# Patient Record
Sex: FEMALE | Race: WHITE | Employment: UNEMPLOYED | URBAN - METROPOLITAN AREA
[De-identification: names, ages, dates, MRNs, and addresses within clinical notes are randomized per-mention and may not be internally consistent; named-entity substitution may affect disease eponyms.]

---

## 2018-08-12 ENCOUNTER — APPOINTMENT (OUTPATIENT)
Dept: CT IMAGING | Facility: HOSPITAL | Age: 33
End: 2018-08-12
Attending: EMERGENCY MEDICINE
Payer: COMMERCIAL

## 2018-08-12 ENCOUNTER — HOSPITAL ENCOUNTER (EMERGENCY)
Facility: HOSPITAL | Age: 33
Discharge: HOME | End: 2018-08-12
Attending: EMERGENCY MEDICINE
Payer: COMMERCIAL

## 2018-08-12 VITALS
HEART RATE: 56 BPM | RESPIRATION RATE: 16 BRPM | DIASTOLIC BLOOD PRESSURE: 55 MMHG | WEIGHT: 125 LBS | TEMPERATURE: 99 F | OXYGEN SATURATION: 100 % | SYSTOLIC BLOOD PRESSURE: 94 MMHG

## 2018-08-12 DIAGNOSIS — S16.1XXA STRAIN OF NECK MUSCLE, INITIAL ENCOUNTER: ICD-10-CM

## 2018-08-12 DIAGNOSIS — G47.00 INSOMNIA, UNSPECIFIED TYPE: Primary | ICD-10-CM

## 2018-08-12 DIAGNOSIS — M62.838 MUSCLE SPASM: ICD-10-CM

## 2018-08-12 LAB — B-HCG UR QL: NEGATIVE

## 2018-08-12 PROCEDURE — 72125 CT NECK SPINE W/O DYE: CPT | Performed by: EMERGENCY MEDICINE

## 2018-08-12 PROCEDURE — 96372 THER/PROPH/DIAG INJ SC/IM: CPT

## 2018-08-12 PROCEDURE — 81025 URINE PREGNANCY TEST: CPT

## 2018-08-12 PROCEDURE — 99284 EMERGENCY DEPT VISIT MOD MDM: CPT

## 2018-08-12 RX ORDER — LIDOCAINE 50 MG/G
1 PATCH TOPICAL EVERY 24 HOURS
Qty: 6 PATCH | Refills: 0 | Status: SHIPPED | OUTPATIENT
Start: 2018-08-12 | End: 2018-08-18

## 2018-08-12 RX ORDER — NAPROXEN 500 MG/1
500 TABLET ORAL 2 TIMES DAILY PRN
Qty: 10 TABLET | Refills: 0 | Status: SHIPPED | OUTPATIENT
Start: 2018-08-12 | End: 2018-08-17

## 2018-08-12 RX ORDER — LIDOCAINE 50 MG/G
1 PATCH TOPICAL ONCE
Status: DISCONTINUED | OUTPATIENT
Start: 2018-08-12 | End: 2018-08-12

## 2018-08-12 RX ORDER — KETOROLAC TROMETHAMINE 30 MG/ML
30 INJECTION, SOLUTION INTRAMUSCULAR; INTRAVENOUS ONCE
Status: COMPLETED | OUTPATIENT
Start: 2018-08-12 | End: 2018-08-12

## 2018-08-12 RX ORDER — DIAZEPAM 5 MG/1
5 TABLET ORAL EVERY 12 HOURS PRN
Qty: 6 TABLET | Refills: 0 | Status: SHIPPED | OUTPATIENT
Start: 2018-08-12 | End: 2018-08-15

## 2018-08-12 RX ORDER — DIAZEPAM 5 MG/1
5 TABLET ORAL ONCE
Status: COMPLETED | OUTPATIENT
Start: 2018-08-12 | End: 2018-08-12

## 2018-08-12 RX ORDER — MECLIZINE HYDROCHLORIDE 25 MG/1
25 TABLET ORAL ONCE
Status: COMPLETED | OUTPATIENT
Start: 2018-08-12 | End: 2018-08-12

## 2018-08-12 NOTE — ED PROVIDER NOTES
Patient Seen in: [facility] AND [facility] Emergency Department    History   Patient presents with:  Neck Pain (musculoskeletal, neurologic)  Insomnia (neurologic)    Stated Complaint: Neck pain    HPI    35 y/o female w/ no sig PMH who has had trouble sleeping per  Neurological: Alert and oriented to person, place, and time. Skin: Skin is warm and dry. Psychiatric: Normal mood and affect. Behavior is normal.   Nursing note and vitals reviewed.     Differential diagnosis includes insomnia, cervical strain, ce Patch  Place 1 patch onto the skin daily. Qty: 6 patch Refills: 0    naproxen 500 MG Oral Tab  Take 1 tablet (500 mg total) by mouth 2 (two) times daily as needed.   Qty: 10 tablet Refills: 0    diazepam 5 MG Oral Tab  Take 1 tablet (5 mg total) by mouth e

## 2018-08-12 NOTE — ED INITIAL ASSESSMENT (HPI)
Triage:  Patient states she has been unable to sleep for the past 3 days due to neck pain and headaches. Took sleeping pill without relief.

## 2020-07-17 ENCOUNTER — OFFICE VISIT (OUTPATIENT)
Dept: FAMILY MEDICINE CLINIC | Facility: CLINIC | Age: 35
End: 2020-07-17
Payer: COMMERCIAL

## 2020-07-17 VITALS
HEART RATE: 56 BPM | SYSTOLIC BLOOD PRESSURE: 103 MMHG | DIASTOLIC BLOOD PRESSURE: 67 MMHG | BODY MASS INDEX: 23.37 KG/M2 | TEMPERATURE: 99 F | WEIGHT: 127 LBS | HEIGHT: 62 IN

## 2020-07-17 DIAGNOSIS — R10.2 PELVIC PAIN: ICD-10-CM

## 2020-07-17 DIAGNOSIS — Z98.890 HISTORY OF REMOVAL OF OVARIAN CYST: ICD-10-CM

## 2020-07-17 DIAGNOSIS — N94.3 PMS (PREMENSTRUAL SYNDROME): ICD-10-CM

## 2020-07-17 DIAGNOSIS — Z87.42 HISTORY OF REMOVAL OF OVARIAN CYST: ICD-10-CM

## 2020-07-17 DIAGNOSIS — Z00.00 ANNUAL PHYSICAL EXAM: Primary | ICD-10-CM

## 2020-07-17 PROCEDURE — 3074F SYST BP LT 130 MM HG: CPT | Performed by: FAMILY MEDICINE

## 2020-07-17 PROCEDURE — 3078F DIAST BP <80 MM HG: CPT | Performed by: FAMILY MEDICINE

## 2020-07-17 PROCEDURE — 3008F BODY MASS INDEX DOCD: CPT | Performed by: FAMILY MEDICINE

## 2020-07-17 PROCEDURE — 99385 PREV VISIT NEW AGE 18-39: CPT | Performed by: FAMILY MEDICINE

## 2020-07-17 NOTE — PATIENT INSTRUCTIONS
El síndrome premenstrual y gilbert relación con el ciclo  El síndrome premenstrual (SPM) es diamante afección real causada por la respuesta del cuerpo ante un ciclo menstrual normal. Los [employer] niveles de hormonas (mensajeros químicos) del cuerpo dan lugar · La progesterona puede tener un efecto “tranquilizante” del cuerpo y así aliviar los síntomas físicos ocasionados por los cambios mensuales del organismo.  En mujeres con SPM, la progesterona puede no tener [employer] tranquilizante y cristina síntomas podrían · Ronnie ejercicios wagner 30 minutos gay todos los días de la [place]. Si esto le parece demasiado, comience con guilherme minutos al día y vaya aumentando el tiempo progresivamente. · Encuentre maneras de integrar la actividad física en gilbert elvira cotidiana.  Int Usted no tiene que lidiar con el SPM krista. Para ayudar a sobrellevar el SPM:  · Hable con cristina familiares, amigos y compañeros de [place].   · Hágales saber cómo pueden ayudarle cuando presente los síntomas del SPM.  · Hable con cristina amigas y apóyense unas a · El magnesio puede ayudar aliviar la sensación de inflamación abdominal y la sensibilidad de los senos. Se encuentra en muchos alimentos, tales lindsey las frutas frescas y los vegetales.  Para ayudar a gilbert cuerpo a obtener suficiente magnesio, coma schuyler o má · El azúcar es un carbohidrato que le proporciona sólo pequeñas dosis de [place]. Si le encanta el [place], elija alimentos que también melissa ricos en fibras, lindsey diamante [place] sukhjinder sin pelar o un muffin de salvado.   · La cafeína puede perturbar el sueño y esto hac · Efectos secundarios. Los antiinflamatorios no esteroides pueden causar trastornos estomacales, rachel tomarlos con alimentos puede [employer]. · Otras preocupaciones. No es recomendable que usted tome más de un tipo de NSAID al [employer].  Adem · Beneficios. Los SSRI ayudan a [employer], por ejemplo, irritabilidad, depresión y [place] de humor. También alivian síntomas físicos lindsey la distensión abdominal, la sensibilidad de las mamas y los cambios de apetito.   · Efectos sec

## 2020-07-17 NOTE — PROGRESS NOTES
HPI:   Porfirio Lomas is a 37year old female who presents for a complete physical exam.    Work: Works as an elderly caretaker. Social: Lives with family  Diet: eats home cooked meals  Exercise: exercises 4 times a week, mainly cardio/weights.    GYN 07/04/2020 (Exact Date)   BMI 23.23 kg/m²     GENERAL: well developed, well nourished, in no apparent distress  SKIN: no rashes, no suspicious lesions  HEENT: atraumatic, normocephalic, ears are clear  EYES: PERRLA, EOMI,conjunctiva are clear  NECK: supple - US PELVIS (TRANSVAGINAL PELVIS) (CPT=76830); Future    4. History of removal of ovarian cyst  - as above   - US PELVIS (TRANSVAGINAL PELVIS) (CPT=76830);  Future      Bouchra Pollard MD  7/17/2020  2:06 PM

## 2020-07-21 ENCOUNTER — APPOINTMENT (OUTPATIENT)
Dept: LAB | Age: 35
End: 2020-07-21
Attending: FAMILY MEDICINE
Payer: COMMERCIAL

## 2020-07-21 LAB
ALBUMIN SERPL-MCNC: 4 G/DL (ref 3.4–5)
ALBUMIN/GLOB SERPL: 1.1 {RATIO} (ref 1–2)
ALP LIVER SERPL-CCNC: 67 U/L (ref 37–98)
ALT SERPL-CCNC: 27 U/L (ref 13–56)
ANION GAP SERPL CALC-SCNC: 9 MMOL/L (ref 0–18)
AST SERPL-CCNC: 20 U/L (ref 15–37)
BASOPHILS # BLD AUTO: 0.02 X10(3) UL (ref 0–0.2)
BASOPHILS NFR BLD AUTO: 0.4 %
BILIRUB SERPL-MCNC: 0.5 MG/DL (ref 0.1–2)
BUN BLD-MCNC: 13 MG/DL (ref 7–18)
BUN/CREAT SERPL: 16.9 (ref 10–20)
CALCIUM BLD-MCNC: 8.8 MG/DL (ref 8.5–10.1)
CHLORIDE SERPL-SCNC: 109 MMOL/L (ref 98–112)
CHOLEST SMN-MCNC: 189 MG/DL (ref ?–200)
CO2 SERPL-SCNC: 24 MMOL/L (ref 21–32)
CREAT BLD-MCNC: 0.77 MG/DL (ref 0.55–1.02)
DEPRECATED RDW RBC AUTO: 40.8 FL (ref 35.1–46.3)
EOSINOPHIL # BLD AUTO: 0.09 X10(3) UL (ref 0–0.7)
EOSINOPHIL NFR BLD AUTO: 1.7 %
ERYTHROCYTE [DISTWIDTH] IN BLOOD BY AUTOMATED COUNT: 11.9 % (ref 11–15)
EST. AVERAGE GLUCOSE BLD GHB EST-MCNC: 105 MG/DL (ref 68–126)
GLOBULIN PLAS-MCNC: 3.7 G/DL (ref 2.8–4.4)
GLUCOSE BLD-MCNC: 80 MG/DL (ref 70–99)
HBA1C MFR BLD HPLC: 5.3 % (ref ?–5.7)
HCT VFR BLD AUTO: 40.5 % (ref 35–48)
HDLC SERPL-MCNC: 54 MG/DL (ref 40–59)
HGB BLD-MCNC: 13.4 G/DL (ref 12–16)
IMM GRANULOCYTES # BLD AUTO: 0.01 X10(3) UL (ref 0–1)
IMM GRANULOCYTES NFR BLD: 0.2 %
LDLC SERPL CALC-MCNC: 117 MG/DL (ref ?–100)
LYMPHOCYTES # BLD AUTO: 2.08 X10(3) UL (ref 1–4)
LYMPHOCYTES NFR BLD AUTO: 40.2 %
M PROTEIN MFR SERPL ELPH: 7.7 G/DL (ref 6.4–8.2)
MCH RBC QN AUTO: 31 PG (ref 26–34)
MCHC RBC AUTO-ENTMCNC: 33.1 G/DL (ref 31–37)
MCV RBC AUTO: 93.8 FL (ref 80–100)
MONOCYTES # BLD AUTO: 0.34 X10(3) UL (ref 0.1–1)
MONOCYTES NFR BLD AUTO: 6.6 %
NEUTROPHILS # BLD AUTO: 2.64 X10 (3) UL (ref 1.5–7.7)
NEUTROPHILS # BLD AUTO: 2.64 X10(3) UL (ref 1.5–7.7)
NEUTROPHILS NFR BLD AUTO: 50.9 %
NONHDLC SERPL-MCNC: 135 MG/DL (ref ?–130)
OSMOLALITY SERPL CALC.SUM OF ELEC: 293 MOSM/KG (ref 275–295)
PATIENT FASTING Y/N/NP: YES
PATIENT FASTING Y/N/NP: YES
PLATELET # BLD AUTO: 305 10(3)UL (ref 150–450)
POTASSIUM SERPL-SCNC: 4.1 MMOL/L (ref 3.5–5.1)
RBC # BLD AUTO: 4.32 X10(6)UL (ref 3.8–5.3)
SODIUM SERPL-SCNC: 142 MMOL/L (ref 136–145)
TRIGL SERPL-MCNC: 90 MG/DL (ref 30–149)
TSI SER-ACNC: 1.83 MIU/ML (ref 0.36–3.74)
VLDLC SERPL CALC-MCNC: 18 MG/DL (ref 0–30)
WBC # BLD AUTO: 5.2 X10(3) UL (ref 4–11)

## 2020-07-21 PROCEDURE — 82306 VITAMIN D 25 HYDROXY: CPT | Performed by: FAMILY MEDICINE

## 2020-07-21 PROCEDURE — 80053 COMPREHEN METABOLIC PANEL: CPT | Performed by: FAMILY MEDICINE

## 2020-07-21 PROCEDURE — 85025 COMPLETE CBC W/AUTO DIFF WBC: CPT | Performed by: FAMILY MEDICINE

## 2020-07-21 PROCEDURE — 36415 COLL VENOUS BLD VENIPUNCTURE: CPT | Performed by: FAMILY MEDICINE

## 2020-07-21 PROCEDURE — 84443 ASSAY THYROID STIM HORMONE: CPT | Performed by: FAMILY MEDICINE

## 2020-07-21 PROCEDURE — 80061 LIPID PANEL: CPT | Performed by: FAMILY MEDICINE

## 2020-07-21 PROCEDURE — 83036 HEMOGLOBIN GLYCOSYLATED A1C: CPT | Performed by: FAMILY MEDICINE

## 2020-07-22 LAB — 25(OH)D3 SERPL-MCNC: 35.2 NG/ML (ref 30–100)

## 2020-07-29 ENCOUNTER — TELEPHONE (OUTPATIENT)
Dept: FAMILY MEDICINE CLINIC | Facility: CLINIC | Age: 35
End: 2020-07-29

## 2020-07-29 NOTE — TELEPHONE ENCOUNTER
Verified name and . Results/recommendations reviewed with pt and pt verb understanding and agrees to plan                   Danny Caba MD  2020  2:40 PM      Results reviewed.  Tests show no significant abnormalities, blood counts, vitamin

## 2020-07-29 NOTE — TELEPHONE ENCOUNTER
Called pt- VM is not set up , unable to leave voice message. Dorota Hagen  7/27/2020  4:31 PM      Called pt and unable to leave message, no voicemail.      Tima Molina, [facility] Debbie  7/23/2020  3:37 PM      Attempted to reach patient, vm not set up

## 2021-02-02 ENCOUNTER — TELEMEDICINE (OUTPATIENT)
Dept: FAMILY MEDICINE CLINIC | Facility: CLINIC | Age: 36
End: 2021-02-02

## 2021-02-02 DIAGNOSIS — N94.3 PMS (PREMENSTRUAL SYNDROME): Primary | ICD-10-CM

## 2021-02-02 DIAGNOSIS — N39.46 MIXED STRESS AND URGE URINARY INCONTINENCE: ICD-10-CM

## 2021-02-02 DIAGNOSIS — R10.2 PELVIC PAIN: ICD-10-CM

## 2021-02-02 PROCEDURE — 99214 OFFICE O/P EST MOD 30 MIN: CPT | Performed by: FAMILY MEDICINE

## 2021-02-02 NOTE — PROGRESS NOTES
Virtual Telephone Check-In    Brad Steward verbally consents to a Virtual/Telephone Check-In service on 02/02/21.     Patient understands and accepts financial responsibility for any deductible, co-insurance and/or co-pays associated with this service MD  02/02/21

## 2021-02-22 ENCOUNTER — HOSPITAL ENCOUNTER (OUTPATIENT)
Dept: ULTRASOUND IMAGING | Age: 36
Discharge: HOME | End: 2021-02-22
Attending: FAMILY MEDICINE
Payer: COMMERCIAL

## 2021-02-22 DIAGNOSIS — R10.2 PELVIC PAIN: ICD-10-CM

## 2021-02-22 DIAGNOSIS — N94.3 PMS (PREMENSTRUAL SYNDROME): ICD-10-CM

## 2021-02-22 PROCEDURE — 76856 US EXAM PELVIC COMPLETE: CPT | Performed by: FAMILY MEDICINE

## 2021-02-22 PROCEDURE — 76830 TRANSVAGINAL US NON-OB: CPT | Performed by: FAMILY MEDICINE

## 2021-03-02 ENCOUNTER — OFFICE VISIT (OUTPATIENT)
Dept: UROLOGY | Facility: HOSPITAL | Age: 36
End: 2021-03-02
Attending: OBSTETRICS & GYNECOLOGY
Payer: COMMERCIAL

## 2021-03-02 VITALS
SYSTOLIC BLOOD PRESSURE: 120 MMHG | HEIGHT: 62 IN | BODY MASS INDEX: 23.37 KG/M2 | WEIGHT: 127 LBS | DIASTOLIC BLOOD PRESSURE: 64 MMHG

## 2021-03-02 DIAGNOSIS — N39.41 URGE INCONTINENCE: ICD-10-CM

## 2021-03-02 DIAGNOSIS — N39.3 FEMALE STRESS INCONTINENCE: Primary | ICD-10-CM

## 2021-03-02 PROCEDURE — 99212 OFFICE O/P EST SF 10 MIN: CPT

## 2021-03-02 NOTE — PROGRESS NOTES
ID: Rosa Hinkle  : 1985  Date: 3/2/2021     Referred by Dr. Mary Fiore MD    Patient presents with:   Incontinence      HPI:  The patient is a 37year-old female,  (vaginal deliveries), who presents for evaluation of urinary leaking midline; thyroid is smooth, not enlarged, and without nodules. No lymphadenopathy or masses palpated. LUNGS:  Normal respiratory effort.     ABDOMEN: Non tender to palpation, tone normal without rigidity or guarding, no masses present, no evidence of her

## 2021-04-06 ENCOUNTER — OFFICE VISIT (OUTPATIENT)
Dept: UROLOGY | Facility: HOSPITAL | Age: 36
End: 2021-04-06
Attending: OBSTETRICS & GYNECOLOGY
Payer: COMMERCIAL

## 2021-04-06 VITALS
BODY MASS INDEX: 23.37 KG/M2 | RESPIRATION RATE: 20 BRPM | TEMPERATURE: 99 F | WEIGHT: 127 LBS | HEIGHT: 62 IN | DIASTOLIC BLOOD PRESSURE: 62 MMHG | SYSTOLIC BLOOD PRESSURE: 120 MMHG

## 2021-04-06 DIAGNOSIS — N39.41 URGE INCONTINENCE: ICD-10-CM

## 2021-04-06 DIAGNOSIS — N39.3 FEMALE STRESS INCONTINENCE: Primary | ICD-10-CM

## 2021-04-06 PROCEDURE — 99212 OFFICE O/P EST SF 10 MIN: CPT

## 2021-04-06 PROCEDURE — 57160 INSERT PESSARY/OTHER DEVICE: CPT

## 2021-04-06 NOTE — PROGRESS NOTES
ID: Con Egan  : 1985  Date: 21      Patient presents with: Incontinence      HPI:  The patient is a 37year-old female,  (vaginal deliveries), who was last seen in the office on 3/2/21.   Please refer to previous office note for f with our nurse. She felt comfortable with the pessary. Instructed on use and cleaning. She will use dental floss technique if needed for removal. Follow up in 4 weeks or sooner prn.      Jonathan Bustillo MD, [facility]  Female Pelvic Medicine and  Recons

## 2021-06-01 ENCOUNTER — TELEPHONE (OUTPATIENT)
Dept: UROLOGY | Facility: HOSPITAL | Age: 36
End: 2021-06-01

## 2021-06-01 NOTE — TELEPHONE ENCOUNTER
Called patient using the  service. Spoke with Nabil at [phone]. He was unable to leave a voicemail. Patients voicemail is not set up. Will attempt to reach out to patient again. Patient has missed her last two appointments.  Patient has pessary in

## 2021-06-02 ENCOUNTER — TELEPHONE (OUTPATIENT)
Dept: UROLOGY | Facility: HOSPITAL | Age: 36
End: 2021-06-02

## 2021-06-02 NOTE — TELEPHONE ENCOUNTER
Attempted to reach patient today in regards to her missed appointments with Dr. Radames Christianson. Unable to leave a message. Mailbox not set up to leave voicemail.

## 2021-06-16 ENCOUNTER — NURSE TRIAGE (OUTPATIENT)
Dept: FAMILY MEDICINE CLINIC | Facility: CLINIC | Age: 36
End: 2021-06-16

## 2021-06-16 NOTE — TELEPHONE ENCOUNTER
Please reply to pool: EM RN TRIAGE      Action Requested: Summary for Provider     []  Critical Lab, Recommendations Needed  [] Need Additional Advice  [x]   FYI    []   Need Orders  [] Need Medications Sent to Pharmacy  []  Other     SUMMARY: Advised ED

## 2021-06-17 NOTE — TELEPHONE ENCOUNTER
With language line  Crissy Marsh ID # [id number]. Identified patient's name and . No answer, unable to leave message, no voicemail set up.

## 2021-06-22 NOTE — TELEPHONE ENCOUNTER
NO ER note/encounter. No future appointments. Using Rg Duran #[id number], called patient and 's number but both just keep ringing and went to voice mail, but says voice mail not set up yet, unable to leave any messages.   No phone response

## 2021-11-11 ENCOUNTER — OFFICE VISIT (OUTPATIENT)
Dept: FAMILY MEDICINE CLINIC | Facility: CLINIC | Age: 36
End: 2021-11-11
Payer: COMMERCIAL

## 2021-11-11 VITALS
SYSTOLIC BLOOD PRESSURE: 113 MMHG | HEART RATE: 57 BPM | HEIGHT: 62 IN | DIASTOLIC BLOOD PRESSURE: 73 MMHG | TEMPERATURE: 99 F | WEIGHT: 130 LBS | BODY MASS INDEX: 23.92 KG/M2

## 2021-11-11 DIAGNOSIS — N30.00 ACUTE CYSTITIS WITHOUT HEMATURIA: Primary | ICD-10-CM

## 2021-11-11 PROCEDURE — 3078F DIAST BP <80 MM HG: CPT | Performed by: FAMILY MEDICINE

## 2021-11-11 PROCEDURE — 99213 OFFICE O/P EST LOW 20 MIN: CPT | Performed by: FAMILY MEDICINE

## 2021-11-11 PROCEDURE — 3074F SYST BP LT 130 MM HG: CPT | Performed by: FAMILY MEDICINE

## 2021-11-11 PROCEDURE — 3008F BODY MASS INDEX DOCD: CPT | Performed by: FAMILY MEDICINE

## 2021-11-11 RX ORDER — PHENAZOPYRIDINE HYDROCHLORIDE 100 MG/1
100 TABLET, FILM COATED ORAL 3 TIMES DAILY PRN
Qty: 30 TABLET | Refills: 0 | Status: SHIPPED | OUTPATIENT
Start: 2021-11-11

## 2021-11-11 RX ORDER — NITROFURANTOIN 25; 75 MG/1; MG/1
100 CAPSULE ORAL 2 TIMES DAILY
Qty: 10 CAPSULE | Refills: 0 | Status: SHIPPED | OUTPATIENT
Start: 2021-11-11 | End: 2021-11-16

## 2021-11-12 NOTE — PROGRESS NOTES
Patient presents with:  Urinary Symptoms: c/o urgency     HPI:   Kelly Hernandez is a 31year old female who presents to clinic with complaints of urinary urgency that started 24 hours ago. Feels some incomplete voiding and frequency.    No dysuria, hema

## 2021-11-15 ENCOUNTER — TELEPHONE (OUTPATIENT)
Dept: FAMILY MEDICINE CLINIC | Facility: CLINIC | Age: 36
End: 2021-11-15

## 2022-02-11 ENCOUNTER — NURSE TRIAGE (OUTPATIENT)
Dept: FAMILY MEDICINE CLINIC | Facility: CLINIC | Age: 37
End: 2022-02-11

## 2022-07-01 ENCOUNTER — OFFICE VISIT (OUTPATIENT)
Dept: FAMILY MEDICINE CLINIC | Facility: CLINIC | Age: 37
End: 2022-07-01
Payer: COMMERCIAL

## 2022-07-01 VITALS
DIASTOLIC BLOOD PRESSURE: 61 MMHG | SYSTOLIC BLOOD PRESSURE: 103 MMHG | WEIGHT: 131 LBS | HEART RATE: 51 BPM | BODY MASS INDEX: 24.11 KG/M2 | HEIGHT: 62 IN

## 2022-07-01 DIAGNOSIS — S05.8X2A ABRASION OF LEFT EYE, INITIAL ENCOUNTER: Primary | ICD-10-CM

## 2022-08-02 ENCOUNTER — OFFICE VISIT (OUTPATIENT)
Dept: FAMILY MEDICINE CLINIC | Facility: CLINIC | Age: 37
End: 2022-08-02
Payer: COMMERCIAL

## 2022-08-02 VITALS
HEART RATE: 54 BPM | HEIGHT: 62 IN | SYSTOLIC BLOOD PRESSURE: 115 MMHG | DIASTOLIC BLOOD PRESSURE: 75 MMHG | BODY MASS INDEX: 23.74 KG/M2 | WEIGHT: 129 LBS | TEMPERATURE: 98 F

## 2022-08-02 DIAGNOSIS — R22.32 MASS OF LEFT AXILLA: Primary | ICD-10-CM

## 2022-08-02 PROCEDURE — 99213 OFFICE O/P EST LOW 20 MIN: CPT | Performed by: FAMILY MEDICINE

## 2022-08-02 PROCEDURE — 3074F SYST BP LT 130 MM HG: CPT | Performed by: FAMILY MEDICINE

## 2022-08-02 PROCEDURE — 3008F BODY MASS INDEX DOCD: CPT | Performed by: FAMILY MEDICINE

## 2022-08-02 PROCEDURE — 3078F DIAST BP <80 MM HG: CPT | Performed by: FAMILY MEDICINE

## 2022-08-04 ENCOUNTER — TELEPHONE (OUTPATIENT)
Dept: FAMILY MEDICINE CLINIC | Facility: CLINIC | Age: 37
End: 2022-08-04

## 2022-08-04 DIAGNOSIS — R22.32 MASS OF LEFT AXILLA: Primary | ICD-10-CM

## 2022-08-04 NOTE — TELEPHONE ENCOUNTER
Per Collin Rosen any patient that is over 27 yrs old need diagnostic Mammogram, need order patient can have appointment.

## 2022-08-05 NOTE — TELEPHONE ENCOUNTER
Would verify with mammogram department that I do not need to add another diagnosis code in order to get her diagnostic mammogram approved. Pended order signed. Thank you    1. Mass of left axilla  - MEGAN DELMY 2D+3D DIAGNOSTIC MEGAN  BILAT (CPT=77066/42271);  Future

## 2022-08-15 NOTE — TELEPHONE ENCOUNTER
Patient calling for her results from mammogram and ultra sound. Please call with  at [phone],[facility].

## 2022-08-15 NOTE — TELEPHONE ENCOUNTER
With  ID # [id number]    Patient contacted (name and  of patient verified). All results and recommendations reviewed. Patient verbalizes understanding; denies further questions and agrees with plan of care.

## 2022-08-15 NOTE — TELEPHONE ENCOUNTER
Final results for mammogram and US breast are in the system but Dr Alex Gould still needs to review them.

## 2022-08-15 NOTE — TELEPHONE ENCOUNTER
Please inform patient that her diagnostic mammogram did not show any abnormalities in the right breast but did show a mass at the 10 o'clock position in her left breast.  It measures just over a centimeter. 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 The recommendation by radiology is to repeat imaging with a mammogram and an ultrasound in 6 months. This will help the all you determine stability. I have placed the orders and she can schedule these at her earliest convenience. Thanks      1. Mass of upper inner quadrant of left breast  - US BREAST LEFT LIMITED (DWY=10844); Future  - [facility] DIAGNOSTIC BILATERAL (CPT=77066);  Future

## 2022-08-18 NOTE — TELEPHONE ENCOUNTER
Please call radiology department and have them cancel the order I placed for the bilateral diagnostic mammogram.  I cannot cancel it on my end. Patient only needs diagnostic mammogram left breast and ultrasound left breast in 6 months.   Thank you

## 2023-01-07 NOTE — ED INITIAL ASSESSMENT (HPI)
Patient presents to the ED via EMS c/o RLQ and LLQ abdominal pain x one hour. Pt is 14 weeks pregnant. Denies any vaginal bleeding or discharge. This is patient's fourth pregnancy, one previous miscarriage. Denies vomiting, but reports nausea.

## (undated) NOTE — LETTER
6/22/2021              [facility]         Dear Edil Burgos,    This letter is to inform you that our office has made several attempts to reach you by phone without success.   We were attempting to

## (undated) NOTE — ED AVS SNAPSHOT
Radha Vidal   MRN: [id number]    Department:  [facility] Emergency Department   Date of Visit:  8/12/2018           Disclosure     Insurance plans vary and the physician(s) referred by the ER may not be covered by your plan.  Please conta CARE PHYSICIAN AT ONCE OR RETURN IMMEDIATELY TO THE EMERGENCY DEPARTMENT. If you have been prescribed any medication(s), please fill your prescription right away and begin taking the medication(s) as directed.   If you believe that any of the medications